# Patient Record
Sex: MALE | Race: WHITE | NOT HISPANIC OR LATINO | Employment: FULL TIME | URBAN - METROPOLITAN AREA
[De-identification: names, ages, dates, MRNs, and addresses within clinical notes are randomized per-mention and may not be internally consistent; named-entity substitution may affect disease eponyms.]

---

## 2024-09-05 ENCOUNTER — OFFICE VISIT (OUTPATIENT)
Dept: OTOLARYNGOLOGY | Facility: CLINIC | Age: 64
End: 2024-09-05
Payer: COMMERCIAL

## 2024-09-05 DIAGNOSIS — H90.3 ASNHL (ASYMMETRICAL SENSORINEURAL HEARING LOSS): Primary | ICD-10-CM

## 2024-09-05 DIAGNOSIS — R05.3 CHRONIC COUGH: ICD-10-CM

## 2024-09-05 DIAGNOSIS — J31.0 CHRONIC RHINITIS: ICD-10-CM

## 2024-09-05 DIAGNOSIS — G47.33 OSA (OBSTRUCTIVE SLEEP APNEA): ICD-10-CM

## 2024-09-05 DIAGNOSIS — K21.9 CHRONIC GERD: ICD-10-CM

## 2024-09-05 PROCEDURE — 99204 OFFICE O/P NEW MOD 45 MIN: CPT | Performed by: GENERAL PRACTICE

## 2024-09-05 PROCEDURE — 31575 DIAGNOSTIC LARYNGOSCOPY: CPT | Performed by: GENERAL PRACTICE

## 2024-09-05 RX ORDER — TADALAFIL 10 MG/1
TABLET ORAL
COMMUNITY
Start: 2024-03-19

## 2024-09-05 RX ORDER — CLOMIPHENE CITRATE 50 MG/1
50 TABLET ORAL
COMMUNITY
Start: 2024-07-30

## 2024-09-05 RX ORDER — ATORVASTATIN CALCIUM 40 MG/1
1 TABLET, FILM COATED ORAL
COMMUNITY
Start: 2024-08-19

## 2024-09-05 RX ORDER — LISINOPRIL 20 MG/1
1 TABLET ORAL
COMMUNITY
Start: 2024-08-19

## 2024-09-05 RX ORDER — OMEPRAZOLE 20 MG/1
20 CAPSULE, DELAYED RELEASE ORAL
Qty: 30 CAPSULE | Refills: 3 | Status: SHIPPED | OUTPATIENT
Start: 2024-09-05 | End: 2025-09-05

## 2024-09-05 RX ORDER — AMLODIPINE BESYLATE 10 MG/1
1 TABLET ORAL
COMMUNITY
Start: 2024-08-19

## 2024-09-05 RX ORDER — FAMOTIDINE 20 MG/1
20 TABLET, FILM COATED ORAL NIGHTLY
Qty: 30 TABLET | Refills: 3 | Status: SHIPPED | OUTPATIENT
Start: 2024-09-05 | End: 2025-09-05

## 2024-09-07 NOTE — PROGRESS NOTES
Otolaryngology - Head and Neck Surgery Outpatient New Patient Visit Note        Assessment/Plan:   Problem List Items Addressed This Visit    None  Visit Diagnoses         Codes    ASNHL (asymmetrical sensorineural hearing loss)    -  Primary H90.3    Relevant Orders    Hearing aid evaluation    MR IAC wo IV contrast    Chronic GERD     K21.9    Relevant Medications    omeprazole (PriLOSEC) 20 mg DR capsule    famotidine (Pepcid) 20 mg tablet    Chronic rhinitis     J31.0    Relevant Medications    omeprazole (PriLOSEC) 20 mg DR capsule    famotidine (Pepcid) 20 mg tablet    VARSHA (obstructive sleep apnea)     G47.33    Chronic cough     R05.3            63yoM with asymmetric SNHL, will acquire MRI of IACs.   Medically cleared for hearing amplification, pending MRI.      LPR changes on exam, contrinbuting to chronic throat clearing and cough.   Discussed conservative management of reflux, and risks of long term anti-reflux medications.  Discussed avoidance of triggers, dietary and behavioral management strategies for reflux.  Discussed possible referral to GI for further testing and evaluation.  Will trial maximal medical therapy (combination PPI and H2 blockers) for 1-2 months and assess for symptom response.  Plan for taper of medical management to least possible to control symptoms, in favor of using dietary/behavioral controls.       Deviated septum contributng to snoring and symptoms of VARSHA.  CPAP intolerant and pt is s/p tonsil/UPPP.  Consider correction of deviated septum to aid in symptoms.          Follow up:  -plan for follow up in clinic as needed, after completion of ordered studies, and in 1-2 months    All of the above findings, impressions, treatment planning and follow up plans were discussed with the patient who indicated understanding.  the patient was instructed to contact or return to clinic sooner if symptoms/signs persist or worsen despite the above management.      Hill Felipe MD  Otolaryngology  "- Head and Neck Surgery            History Of Present Illness  Angel Melendez \"Yeison" is a 63 y.o. male presenting for evaluation of hearing loss, cough, snoring.    Reports L>R hearing loss and recent audio shows L>R high frequency SNHL (asymm).   The patient reports slow progression of the hearing loss over time.  The paitent reports a history of intermittent, waxing/waning, nonpulsatile, tonal tinnitus which does not interfere with hearing.   The patient reports a history of significant noise exposure due to occupational exposures, industrial noise, etc.     The patient denies sudden changes in hearing.  The patient denies otalgia, otorrhea, vertigo, or facial weakness.    The patient denies a history of otologic surgery or trauma.  The patient denies a history of blast injury, TBI or concussion associated with hearing loss.  The patient denies a family history of significant hearing loss.  The patient reports a history of AOM as a child, but no recent significant history of ear infection.  No reported exposure to known ototoxins (chemotherapeutics, aminoglycosides, loop diurectics, high dose NSAIDs).   No reported history of radiation treatment to the head/neck.     The pt reports some chronic cough, throat clearing.  Worse in AM and supine.   Notes pharygeal mucous, mild dysphagia.   Infrequent GERD    Notes a  history of snoring and VARSHA.    CPAP intolerant.  Prior tonsil/UPPP with some effect.     Notes some chronic congestion and nasal obstruction.   No significant rhinitis/sinusitis.  No prior nasla trauma/surgery.             Past Medical History  He has no past medical history on file.    Surgical History  He has no past surgical history on file.     Social History  He reports that he has never smoked. He has never used smokeless tobacco. No history on file for alcohol use and drug use.    Family History  No family history on file.     Allergies  Patient has no known allergies.    Review of Systems  ROS: " Pertinent positives as noted in HPI.    - CONSTITUTIONAL: Does not report weight loss, fever or chills.    - HEENT:   Ear: Does not report  , vertigo,    , otalgia, otorrhea  Nose: Does not report  , rhinorrhea, epistaxis, decreased smell  Throat: Does not report pain, dysphagia, odynophagia  Larynx: Does not report hoarseness,  difficulty breathing, pain with speaking (odynophonia)  Neck: Does not report new masses, pain, swelling  Face: Does not report sinus pain, pressure, swelling, numbness, weakness     - RESPIRATORY: Does not report SOB or  .    - CV: Does not report palpitations or chest pain.     - GI: Does not report abdominal pain, nausea, vomiting or diarrhea.    - : Does not report dysuria or urinary frequency.    - MSK: Does not report myalgia or joint pain.    - SKIN: Does not report rash or pruritus.    - NEUROLOGICAL: Does not report headache or syncope.    - PSYCHIATRIC: Does not report recent changes in mood. Does not report anxiety or depression.         Physical Exam:     GENERAL:   Alert & Oriented to person, place and time; Normal affect and appearance. Well developed and well nourished. Conversant & cooperative with examination.     HEAD:   Normocephalic, atraumatic. No sinus tenderness to palpation. Normal parotid bilaterally. Normal facial strength.     NEUROLOGIC:   Cranial nerves II-XII grossly intact, gait WNL. Normal mood and affect.    EYES:   Extraocular movements intact. Pupils equal, round, reactive to light and accommodation. No nystagmus, no ptosis. no scleral injection.    EAR:   Normal auricle. No discomfort or TTP with manipulation.   Handheld otoscopic exam showed normal external auditory canals bilaterally. No purulence or EAC inflammation. Minimal cerumen.   Right tympanic membrane clear and mobile without evidence of perforation, retraction or middle ear effusion.   Left tympanic membrane clear and mobile without evidence of perforation, retraction or middle ear effusion.      NOSE:   No external deformity. No external nasal lesions, lacerations, or scars. Nasal tip symmetrical with normal nasal valves.   Nasal cavity with leftward deviated  septum, normal mucosa and turbinates. No lesions, masses, purulence or polyps.     OC/OP:   Mucous membranes moist, no masses, lesions or exudates.   Normal tongue, floor of mouth, teeth, gums, lips. Normal posterior pharyngeal wall.    Normal tonsils without erythema, exudate or obvious calculi     NECK:   No neck masses or thyroid enlargement. Trachea midline. No tenderness to palpation    LYMPHATIC:   No cervical lymphadenopathy.     RESPIRATORY:   Symmetric chest elevation & no retractions. No significant hoarseness. No increased work of breathing.    CV:   No clubbing or cyanosis. No obvious edema    Skin:   No facial rashes, vesicles or lesions.     Extremities:   No gross abnormalities      Clinic Procedure    Nasal Endoscopy Laryngoscopy Nasophrayngosocopy   Procedure: flexible fiberoptic laryngoscopy, nasopharyngoscopy.   Flexible Fiberoptic Laryngoscopy Indication:   inability to tolerate mirror exam     Risks, benefits, and alternatives, infection risk, bleeding risk and risk of mucosal trauma and pain were discussed with the patient. Verbal consent was obtained prior to the procedure and is detailed in the patient's record.     Procedure Note:      With the patient seated in the exam chair, the bilateral nasal cavity was topically treated with 4% lidocaine and phenylephrine.  The bilateral nasal cavity was intubated with the flexible laryngoscope.   Nasal Endoscopy: Nasal endoscopy was successfully completed using the endoscope and the nasal mucosa, septum, turbinates, and osteomeatal complex were examined.  Nasopharyngoscopy: Nasopharyngoscopy was successfully completed using the endoscope and the nasal mucosa, septum, turbinates, osteomeatal complex, and nasopharynx were examined.   Laryngoscopy: Laryngoscopy was successfully  completed using the flexible laryngoscope and the nasopharynx, hypopharynx, and larynx were examined.  Patient Status: the patient tolerated the procedure well.   Complications: there were no complications.      . After obtaining adequate decongestion and anesthesia, the scope was introduced into the floor of the nasal cavity. The septum, inferior, and middle turbinates were without any mucosal lesions.  The Fossa of [place]mu[place] were clear bilaterally, and good velopharyngeal closure was obtained on phonation.  Base of tongue, tonsillar complex, and posterior pharyngeal wall free of asymmetry or concerning ulcerations or masses.  supraglottic segments with edema, pachydermia and erythema at the esophageal opening and posterior supraglottis.  scattered mucous debris.  No mucosal ulcerations or masses.  True vocal cords abduct and adduct normally with no mucosal or mass lesions.  No masses visualized in the pyriform recesses.  The scope was removed and patient tolerated the procedure well.      Information review:  External sources (notes, imaging, lab results) listed below personally reviewed to aid in medical decision making process.  - audio 7/8/24   - PCM note 5/22/24  -

## 2024-09-20 ENCOUNTER — HOSPITAL ENCOUNTER (OUTPATIENT)
Dept: RADIOLOGY | Facility: CLINIC | Age: 64
Discharge: HOME | End: 2024-09-20
Payer: COMMERCIAL

## 2024-09-20 DIAGNOSIS — H90.3 ASNHL (ASYMMETRICAL SENSORINEURAL HEARING LOSS): ICD-10-CM

## 2024-09-20 PROCEDURE — 70551 MRI BRAIN STEM W/O DYE: CPT

## 2024-10-28 ENCOUNTER — APPOINTMENT (OUTPATIENT)
Dept: OTOLARYNGOLOGY | Facility: CLINIC | Age: 64
End: 2024-10-28
Payer: COMMERCIAL

## 2024-10-28 VITALS — WEIGHT: 175 LBS

## 2024-10-28 DIAGNOSIS — J34.2 DEVIATED SEPTUM: ICD-10-CM

## 2024-10-28 DIAGNOSIS — H90.3 ASYMMETRICAL SENSORINEURAL HEARING LOSS: ICD-10-CM

## 2024-10-28 DIAGNOSIS — K21.9 LARYNGOPHARYNGEAL REFLUX (LPR): ICD-10-CM

## 2024-10-28 DIAGNOSIS — G47.33 OSA (OBSTRUCTIVE SLEEP APNEA): Primary | ICD-10-CM

## 2024-10-28 PROCEDURE — 1036F TOBACCO NON-USER: CPT | Performed by: GENERAL PRACTICE

## 2024-10-28 PROCEDURE — 99214 OFFICE O/P EST MOD 30 MIN: CPT | Performed by: GENERAL PRACTICE

## 2024-10-31 DIAGNOSIS — G47.33 OSA (OBSTRUCTIVE SLEEP APNEA): Primary | ICD-10-CM

## 2024-11-11 ENCOUNTER — APPOINTMENT (OUTPATIENT)
Dept: AUDIOLOGY | Facility: CLINIC | Age: 64
End: 2024-11-11
Payer: COMMERCIAL

## 2024-11-11 DIAGNOSIS — H90.3 ASNHL (ASYMMETRICAL SENSORINEURAL HEARING LOSS): Primary | ICD-10-CM

## 2024-11-11 PROCEDURE — 92700 UNLISTED ORL SERVICE/PX: CPT | Mod: 59,AUDSP

## 2024-11-11 PROCEDURE — 92567 TYMPANOMETRY: CPT

## 2024-11-11 ASSESSMENT — PAIN - FUNCTIONAL ASSESSMENT: PAIN_FUNCTIONAL_ASSESSMENT: 0-10

## 2024-11-11 ASSESSMENT — PAIN SCALES - GENERAL: PAINLEVEL_OUTOF10: 0 - NO PAIN

## 2024-11-11 NOTE — PROGRESS NOTES
"AUDIOLOGY HEARING AID CONSULTATION      Name:  Angel Melendez \"Marco\"  :  1960  Age:  64 y.o.  Date of Encounter:  2024     Time: 2527-3513    HISTORY   Mr. Melendez and spouse was seen today for a hearing aid consultation. A previous hearing evaluation (outside ) on 2024 indicated hearing sensitivity within normal limits with mild sensorineural hearing loss notch centered at 4000 Hz in the right ear, and hearing sensitivity within normal limits sloping to a moderately-severe notch at 4000 Hz, rising to mild at 8000 Hz in the left ear, with excellent word recognition in the right ear, and good in the left ear. Angel indicates he is interested in hearing aids.     Top 3 listening environments to improve:  Improved hearing in all situations.   Hearing at work when there is background noise.  Hearing his wife when he does not have access to facial cues.   Hearing his brother when his is at work and is mumbling while wearing a face mask.    Most important consideration for hearing aids: Increase understanding and decrease saying \"huh?\"    TEST RESULTS - See scanned audiogram in \"Media.\"   Otoscopic Evaluation:   Right Ear: Ear canal clear, tympanic membrane visualized.   Left Ear: Ear canal clear, tympanic membrane visualized.       Tympanometry (226 Hz): An objective evaluation of middle ear function. CPT code: 40292   Right Ear: Type A, middle ear pressure and tympanic membrane compliance within normal limits.   Left Ear: Type A, middle ear pressure and tympanic membrane compliance within normal limits.       Acoustic Reflexes: An objective measure of auditory and facial nerve pathways.   (Probe) Right Ear (ipsi right stimulus ear; contralateral left stimulus ear):   (Ipsilateral) Screened at 1000 Hz, response present.    (Probe) Left Ear (ipsi left stimulus ear; contralateral right stimulus ear):   (Ipsilateral) Screened at 1000 Hz, response present.        Distortion Product Otoacoustic " Emissions (DPOAE): An objective measurement of responses generated by the cochlea when simultaneously stimulated by two pure tone frequencies. CPT code: 01524   Right Ear: Essentially present. Responses present at 6939-0385 and 6085-9569 Hz. Response(s) absent at 1000 and 9670-1159 Hz.   Left Ear: Essentially absent. Response(s) present at 8365-0039 Hz. Responses absent at 9351-3357 Hz.   Present OAEs suggest normal or near cochlear outer hair cell function for corresponding frequency region(s). Absent OAEs with normal middle ear function can be consistent with some degree of hearing loss. Assessment of cochlear outer hair cell function may be impacted by outer or middle ear function.      Test technique: Conventional Audiometry via headphones.   An evaluation of hearing sensitivity via air and bone conduction and speech recognition. CPT code: 18591   Reliability: good       Speech Audiometry:    Right Ear:   Quick-SIN tested was administered at 70 dB HL, and testing revealed a signal to noise ratio (SNR) loss of 1.5, which is categorized as normal/near normal (0-3 dB), patient may hear better than those with normal hearing are able to in noise.   Left Ear:   Quick-SIN tested was administered at 70 dB HL, and testing revealed a signal to noise ratio (SNR) loss of 4.5, which is categorized as a mild SNR loss (3-7 dB), patient may hear almost as well as those with normal hearing are able to hear in noise.   Binaural: Quick-SIN tested was administered at 70 dB HL, and testing revealed a signal to noise ratio (SNR) loss of 1.5, which is categorized as normal/near normal (0-3 dB), patient may hear better than those with normal hearing are able to in noise.       IMPRESSIONS AND RECOMMENDATIONS    The hearing test from 7/11/2024 was reviewed with the patient and spouse. They are a hearing aid candidate in both ears. The benefits and drawbacks of different hearing aid styles and levels of technology were reviewed. The  patient and spouse decided to complete a flex trial prior to purchasing hearing aids. Pricing and policies were reviewed. Basic hearing aid use and parts were discussed.    Hearing Aid Information Right Left    Phonak Phonak   Model Audéo I90-R or I70-R Audéo I90-R or I70-R    2M 2M   Dome Medium open Medium open     Patient paid in full ($150.00) for flex trial.     TREATMENT PLAN     Return for Flex Trial follow-up on 12/11/2024 at 3:30 PM with Roberto Hagen CCC-A.  Return for audiologic assessment in conjunction with otologic care or annually, whichever is sooner. Return sooner if concerns arise.   Continue medical follow-up with PCP/ENT as recommended.  Strive for full-time device use during waking hours, except when activities preclude device safety.  Consider use of good communication strategies. These include but are not limited to the following: get Angel's attention before speaking to him, close the distance between Angel and who is speaking, limit background noise, allow Angel access to visual cues (i.e. facial expressions/mouth movements, pictures, written instructions, etc.). When in situations where background noise cannot be avoided, position yourself so that the background noise is to your back, and you communication partner is seated in front of you, ideally with a quiet area or wall behind them.   Continue medical follow up with primary care provider and/or Ears Nose and Throat (ENT) provider as recommended.  Avoid exposure to loud sounds by moving away from the noise, turning down the volume, or wearing proper hearing protection correctly.      ROBERTO Hagen, CCC-A   Licensed Clinical Audiologist

## 2024-11-15 ENCOUNTER — CLINICAL SUPPORT (OUTPATIENT)
Dept: SLEEP MEDICINE | Facility: CLINIC | Age: 64
End: 2024-11-15
Payer: COMMERCIAL

## 2024-11-15 DIAGNOSIS — G47.33 OSA (OBSTRUCTIVE SLEEP APNEA): ICD-10-CM

## 2024-11-15 NOTE — PROGRESS NOTES
Type of Study: HOME SLEEP STUDY - NOMAD     The patient received equipment and instructions for use of the [employer] [facility] Nomad HSAT device. The patient was instructed how to apply the effort belts, cannula, thermistor. It was also explained how the Nomad and oximeter components work.  The patient was asked to record their sleep for an 8-hour period.     The patient was informed of their responsibility for the device and acknowledged this by signing the HSAT device contract. The patient was asked to return the device on 11/16/2024 between the hours of 08:00 - 15:00  to the [facility].     The patient was instructed to call 911 as usual for any medical- emergencies while at home.  The patient was also given a phone number for troubleshooting when using the device in case there were additional questions.

## 2024-11-27 DIAGNOSIS — K21.9 CHRONIC GERD: ICD-10-CM

## 2024-11-27 DIAGNOSIS — J31.0 CHRONIC RHINITIS: ICD-10-CM

## 2024-11-27 RX ORDER — OMEPRAZOLE 20 MG/1
20 CAPSULE, DELAYED RELEASE ORAL
Qty: 90 CAPSULE | Refills: 2 | Status: SHIPPED | OUTPATIENT
Start: 2024-11-27 | End: 2025-11-27

## 2024-12-10 DIAGNOSIS — G47.33 OSA (OBSTRUCTIVE SLEEP APNEA): Primary | ICD-10-CM

## 2024-12-11 ENCOUNTER — APPOINTMENT (OUTPATIENT)
Dept: AUDIOLOGY | Facility: CLINIC | Age: 64
End: 2024-12-11
Payer: COMMERCIAL

## 2024-12-11 DIAGNOSIS — H90.3 ASNHL (ASYMMETRICAL SENSORINEURAL HEARING LOSS): Primary | ICD-10-CM

## 2024-12-18 NOTE — PROGRESS NOTES
"AUDIOLOGY HEARING AID FLEX TRIAL RETURN     Name:  Angel Melendez \"Marco\"   :  1960  Age:  64 y.o.  Date of Evaluation:  2024    Time: 5647-0947    RECOMMENDATIONS     Return for audiologic assessment in conjunction with otologic care or annually, whichever is sooner. Return sooner if concerns arise.   Continue medical follow-up with PCP/ENT as recommended.  Consider use of good communication strategies. These include but are not limited to the following: get Angel's attention before speaking to him, close the distance between Angel and who is speaking, limit background noise, allow Angel access to visual cues (i.e. facial expressions/mouth movements, pictures, written instructions, etc.). When in situations where background noise cannot be avoided, position yourself so that the background noise is to your back, and you communication partner is seated in front of you, ideally with a quiet area or wall behind them.   Avoid exposure to loud sounds by moving away from the noise, turning down the volume, or wearing proper hearing protection correctly.    HISTORY     Mr. Melendez was seen today for return of Flex Trial hearing aids. Patient was unaccompanied.     Today, he reported perceived benefit with the use of the hearing aids. However, he will defer obtaining hearing aids at this time. Cost of the technology is a factor.     The hearing aids were returned in good condition.      NISSA Hagen, CCC-A   Licensed Clinical Audiologist   "

## 2025-02-18 NOTE — PROGRESS NOTES
"2/28/2025 New patient visit for severe VARSHA and surgical consult    Pt referred be ENT, Hill Felipe. At last visit Dr Felipe writes:  \"63yoM with asymmetric SNHL without concerning findings on MRI IAC.  Medically cleared for hearing amplification PRN.    Ongoing symptoms of VARSHA.  Prior mild VARSHA on PSG >20y ago and wife reports worsening symptoms.  Prior UPPPs.  Will acquire PSG to guide management.  Discussed options of possible CPAP, septoturbinoplasty, referral for [facility].  Pt will consider based on PSG results.   Good effect of PPI on relieving GERD symptoms.  Not taking H2 blocker with any consistency.  Discussed ongoing taper of medical management in favor of dietary/behavioral controls. \"    referred for an initial consultation with a long history of reported loud snoring, witnessed apneas, waking up feeling unrefreshed, excessive daytime fatigue. [place] Sleepiness Scale Score is 14 /24. Fatigue Severity Scale Score is 37 /63. Snoring VAS 10 /10    Sleep study histories: (personally reviewed raw data such as interpretation report, data sheet, hypnogram, and titration table)  Home Sleep Test Completed 11/15/2024  The REI3% was 59.8/hr. The REI4% was 48.6/hr. TIMMY was 0.7/hr.   The supine REI3% was 80.9; non-supine REI3% was 35.8.   The supine REI4% was 71.9; non-supine REI4% was 22.1.   During sleep, based on REI3%, the breathing pattern demonstrated severe sleep disordered breathing, characterized predominantly by obstructive events.   The mean oxygen saturation was 91% during the monitoring time.   The oxygen saturation was < = 88% for 86 minutes.   The SpO2 caleb was 66%.     Patient reports to be claustrophobic and can not tolerate PAP mask.     For nocturnal symptoms (without or prior to treatment), he endorses that there is  snoring, witnessed apneas, nocturia and restless sleep.    Patient does reports nasal obstruction.  It does fluctuate, and has been treated with nasal corticosteroids without " significant improvements.  He does not have a history of nasal surgery or upper airway surgery. He does not have a history of nasal trauma. He does not describe a history of facial pressure type of headaches.  He does not report a history of nasal drainage, denies epistaxis.   NOSE-  15 / 20  Nasal Obstruction VAS- 7 /10    PAP - no history    [employer] qualifications for [employer]    18 years and older    BMI < or = 40    If AHI is abov 65 or BMI is above published policy a letter documenting benefits at the higher numbers will be needed for submission([employer] has template)    Diagnostic PSG/HST within 2 years of initial consult is required for most    PHYSICAL EXAMINATION:  Constitutional:  No acute distress  Voice:  No hoarseness or other abnormality   Respiration:  Breathing comfortably, no stridor  Eyes:  EOM intact, sclera normal  Neuro:  Alert and conversive  Head and Face:  Symmetric facial features, no masses or lesions. Some redness/flaky skin around forehead and cheeks.   Salivary Glands:  Parotid and submandibular glands normal bilaterally  Right Ear:  Normal external ear, external auditory canal, and TM to otoscopy  Left Ear: Normal external ear, external auditory canal, and TM to otoscopy  Nose:  External nose midline, anterior rhinoscopy is normal with limited visualization to the anterior aspect of the inferior turbinates, no bleeding or drainage, no lesions  Oral Cavity/Oropharynx/Lips:  Normal mucous membranes, normal floor of mouth/tongue/OP, no masses or lesions, tonsils 2+. Mallampati/Martins grade 3  Neck/Lymph:  No LAD, no thyroid masses, trachea midline  Skin:  Neck skin is without scar or injury  Psych:  Alert with appropriate mood and affect      Procedure: Diagnostic Nasal/ Pharyngeal Endoscopy     Indication for procedure: To evaluate static and dynamic upper anatomy not visible by anterior rhinoscopy and oral cavitiy examination for anatomic risk factors of obstructive sleep apnea.       Anesthesia: 1% phenylephrine,4% lidocaine topical spray     Description:   A flexible endoscope was used to examine the left and right nasal cavities.  The nasal valve areas were examined for abnormalities or collapse.  The inferior and middle turbinates were evaluated and abnormalities noted. The scope was then passed through the nasopharygneal, oropharyngeal, and hypopharyngeal airway. The Kern Maneuver was performed with the patient in sitting position.Collapse was assessed during a maximal inspiratory effort against a closed mouth and sealed nose. The patient tolerated the procedure without complications and was returned to ambulatory status.       Findings:   Nasopharynx: There is not adenoid hypertrophy.   Oropharynx: There is not palatine tonsillar hypertrophy.   With Mckeon maneuver, soft palatal collapse is grade 2, lateral pharyngeal wall collapse is grade 1.  Hypopharynx: There is not lingual tonsillar hypertrophy, with lingual tonsils of Grade 2. Vocal Cord position with Grade 3 view.  With Mckeon maneuver, base of tongue collapse is grade 3. This is improved with the patient doing a jaw thrust maneuver.    Diagnose:  VARSHA   Deviated septum   Chronic nasal Obstruction    Plan:  Dise + septoplasty + turbinate reduction

## 2025-02-28 ENCOUNTER — APPOINTMENT (OUTPATIENT)
Dept: OTOLARYNGOLOGY | Facility: CLINIC | Age: 65
End: 2025-02-28
Payer: COMMERCIAL

## 2025-02-28 VITALS
WEIGHT: 178 LBS | HEART RATE: 65 BPM | SYSTOLIC BLOOD PRESSURE: 124 MMHG | BODY MASS INDEX: 26.36 KG/M2 | TEMPERATURE: 98.1 F | HEIGHT: 69 IN | DIASTOLIC BLOOD PRESSURE: 78 MMHG

## 2025-02-28 DIAGNOSIS — G47.33 OSA (OBSTRUCTIVE SLEEP APNEA): ICD-10-CM

## 2025-02-28 DIAGNOSIS — J34.2 DEVIATED NASAL SEPTUM: ICD-10-CM

## 2025-02-28 DIAGNOSIS — J34.3 HYPERTROPHY OF NASAL TURBINATES: ICD-10-CM

## 2025-02-28 PROCEDURE — 99214 OFFICE O/P EST MOD 30 MIN: CPT | Mod: 25

## 2025-02-28 PROCEDURE — 1036F TOBACCO NON-USER: CPT

## 2025-02-28 PROCEDURE — 31575 DIAGNOSTIC LARYNGOSCOPY: CPT

## 2025-02-28 PROCEDURE — 3008F BODY MASS INDEX DOCD: CPT

## 2025-02-28 PROCEDURE — 99204 OFFICE O/P NEW MOD 45 MIN: CPT

## 2025-02-28 SDOH — ECONOMIC STABILITY: FOOD INSECURITY: WITHIN THE PAST 12 MONTHS, YOU WORRIED THAT YOUR FOOD WOULD RUN OUT BEFORE YOU GOT MONEY TO BUY MORE.: NEVER TRUE

## 2025-02-28 SDOH — ECONOMIC STABILITY: FOOD INSECURITY: WITHIN THE PAST 12 MONTHS, THE FOOD YOU BOUGHT JUST DIDN'T LAST AND YOU DIDN'T HAVE MONEY TO GET MORE.: NEVER TRUE

## 2025-02-28 ASSESSMENT — ENCOUNTER SYMPTOMS
DEPRESSION: 0
OCCASIONAL FEELINGS OF UNSTEADINESS: 0
LOSS OF SENSATION IN FEET: 0

## 2025-02-28 ASSESSMENT — LIFESTYLE VARIABLES
AUDIT-C TOTAL SCORE: 4
HOW OFTEN DO YOU HAVE A DRINK CONTAINING ALCOHOL: 4 OR MORE TIMES A WEEK
HOW OFTEN DO YOU HAVE SIX OR MORE DRINKS ON ONE OCCASION: NEVER
HOW MANY STANDARD DRINKS CONTAINING ALCOHOL DO YOU HAVE ON A TYPICAL DAY: 1 OR 2
SKIP TO QUESTIONS 9-10: 1

## 2025-02-28 ASSESSMENT — PATIENT HEALTH QUESTIONNAIRE - PHQ9
1. LITTLE INTEREST OR PLEASURE IN DOING THINGS: NOT AT ALL
2. FEELING DOWN, DEPRESSED OR HOPELESS: NOT AT ALL
SUM OF ALL RESPONSES TO PHQ9 QUESTIONS 1 AND 2: 0

## 2025-02-28 ASSESSMENT — PAIN SCALES - GENERAL: PAINLEVEL_OUTOF10: 0-NO PAIN

## 2025-02-28 ASSESSMENT — COLUMBIA-SUICIDE SEVERITY RATING SCALE - C-SSRS
2. HAVE YOU ACTUALLY HAD ANY THOUGHTS OF KILLING YOURSELF?: NO
6. HAVE YOU EVER DONE ANYTHING, STARTED TO DO ANYTHING, OR PREPARED TO DO ANYTHING TO END YOUR LIFE?: NO
1. IN THE PAST MONTH, HAVE YOU WISHED YOU WERE DEAD OR WISHED YOU COULD GO TO SLEEP AND NOT WAKE UP?: NO

## 2025-03-11 ENCOUNTER — TELEPHONE (OUTPATIENT)
Dept: OTOLARYNGOLOGY | Facility: HOSPITAL | Age: 65
End: 2025-03-11
Payer: COMMERCIAL

## 2025-03-11 NOTE — TELEPHONE ENCOUNTER
Topic: denied surgery    Pt called stating surgery was denied for 3/24.     I informed pt that I did receive a message from the precert department this morning stating that the DISE [id number] was denied but the septoplasty, and turbinate resection does not require prior authorization.    Pt aware that the reason the DISE was denied was that he never tried oral appliance.  Pt states he is missing a couple teeth.  Dr Kunz aware and will assess if ot is even a candidate for oral appliance and if peer to peer will be completed.    Pt verbalized understanding

## 2025-03-13 ENCOUNTER — TELEPHONE (OUTPATIENT)
Dept: OTOLARYNGOLOGY | Facility: CLINIC | Age: 65
End: 2025-03-13
Payer: COMMERCIAL

## 2025-03-13 NOTE — TELEPHONE ENCOUNTER
Topic: Denied DISE procedure has been approved     [facility] denied DISE procedure [id number].  Earlier this week I sent Dr Kunz's updated note to [facility] for reconsideration and I set up a peer to peer for today at 2pm.    Dr Junior, [facility]'s medical reviewer, called me this afternoon and states he reviewed the updated clinical note and he has approved the DISE procedure.    Denial overturned.  Armida Wilson in the precert dept and Dr Kunz aware.    I called pt and left a message that the denial was overturned and DISE procedure has been approved.  Pt to call me back if he has questions.

## 2025-03-14 ENCOUNTER — PRE-ADMISSION TESTING (OUTPATIENT)
Dept: PREADMISSION TESTING | Facility: HOSPITAL | Age: 65
End: 2025-03-14
Payer: COMMERCIAL

## 2025-03-14 VITALS
TEMPERATURE: 97.2 F | SYSTOLIC BLOOD PRESSURE: 140 MMHG | BODY MASS INDEX: 25.99 KG/M2 | HEIGHT: 69 IN | OXYGEN SATURATION: 99 % | RESPIRATION RATE: 18 BRPM | DIASTOLIC BLOOD PRESSURE: 81 MMHG | WEIGHT: 175.49 LBS | HEART RATE: 68 BPM

## 2025-03-14 DIAGNOSIS — Z01.818 PRE-OP TESTING: Primary | ICD-10-CM

## 2025-03-14 LAB
ANION GAP SERPL CALC-SCNC: 11 MMOL/L (ref 10–20)
BUN SERPL-MCNC: 16 MG/DL (ref 6–23)
CALCIUM SERPL-MCNC: 9.6 MG/DL (ref 8.6–10.3)
CHLORIDE SERPL-SCNC: 103 MMOL/L (ref 98–107)
CO2 SERPL-SCNC: 26 MMOL/L (ref 21–32)
CREAT SERPL-MCNC: 0.99 MG/DL (ref 0.5–1.3)
EGFRCR SERPLBLD CKD-EPI 2021: 85 ML/MIN/1.73M*2
ERYTHROCYTE [DISTWIDTH] IN BLOOD BY AUTOMATED COUNT: 12.3 % (ref 11.5–14.5)
GLUCOSE SERPL-MCNC: 105 MG/DL (ref 74–99)
HCT VFR BLD AUTO: 43.5 % (ref 41–52)
HGB BLD-MCNC: 14.4 G/DL (ref 13.5–17.5)
MCH RBC QN AUTO: 29.9 PG (ref 26–34)
MCHC RBC AUTO-ENTMCNC: 33.1 G/DL (ref 32–36)
MCV RBC AUTO: 90 FL (ref 80–100)
NRBC BLD-RTO: 0 /100 WBCS (ref 0–0)
PLATELET # BLD AUTO: 376 X10*3/UL (ref 150–450)
POTASSIUM SERPL-SCNC: 4.4 MMOL/L (ref 3.5–5.3)
RBC # BLD AUTO: 4.82 X10*6/UL (ref 4.5–5.9)
SODIUM SERPL-SCNC: 136 MMOL/L (ref 136–145)
WBC # BLD AUTO: 4.6 X10*3/UL (ref 4.4–11.3)

## 2025-03-14 PROCEDURE — 93010 ELECTROCARDIOGRAM REPORT: CPT | Performed by: STUDENT IN AN ORGANIZED HEALTH CARE EDUCATION/TRAINING PROGRAM

## 2025-03-14 PROCEDURE — 80048 BASIC METABOLIC PNL TOTAL CA: CPT

## 2025-03-14 PROCEDURE — 93005 ELECTROCARDIOGRAM TRACING: CPT

## 2025-03-14 PROCEDURE — 99204 OFFICE O/P NEW MOD 45 MIN: CPT | Performed by: NURSE PRACTITIONER

## 2025-03-14 PROCEDURE — 36415 COLL VENOUS BLD VENIPUNCTURE: CPT

## 2025-03-14 PROCEDURE — 85027 COMPLETE CBC AUTOMATED: CPT

## 2025-03-14 ASSESSMENT — DUKE ACTIVITY SCORE INDEX (DASI)
CAN YOU CLIMB A FLIGHT OF STAIRS OR WALK UP A HILL: YES
CAN YOU DO HEAVY WORK AROUND THE HOUSE LIKE SCRUBBING FLOORS OR LIFTING AND MOVING HEAVY FURNITURE: YES
CAN YOU HAVE SEXUAL RELATIONS: YES
CAN YOU TAKE CARE OF YOURSELF (EAT, DRESS, BATHE, OR USE TOILET): YES
CAN YOU DO YARD WORK LIKE RAKING LEAVES, WEEDING OR PUSHING A MOWER: YES
CAN YOU PARTICIPATE IN MODERATE RECREATIONAL ACTIVITIES LIKE GOLF, BOWLING, DANCING, DOUBLES TENNIS OR THROWING A BASEBALL OR FOOTBALL: NO
CAN YOU DO MODERATE WORK AROUND THE HOUSE LIKE VACUUMING, SWEEPING FLOORS OR CARRYING GROCERIES: YES
CAN YOU WALK INDOORS, SUCH AS AROUND YOUR HOUSE: YES
TOTAL_SCORE: 44.7
CAN YOU PARTICIPATE IN STRENOUS SPORTS LIKE SWIMMING, SINGLES TENNIS, FOOTBALL, BASKETBALL, OR SKIING: NO
CAN YOU DO LIGHT WORK AROUND THE HOUSE LIKE DUSTING OR WASHING DISHES: YES
CAN YOU WALK A BLOCK OR TWO ON LEVEL GROUND: YES
DASI METS SCORE: 8.2
CAN YOU RUN A SHORT DISTANCE: YES

## 2025-03-14 NOTE — CPM/PAT H&P
"CPM/PAT Evaluation       Name: Angel Melendez (Angel Melendez)  /Age: 1960/64 y.o.     In-Person       Chief Complaint:     64 yr old male presents to [facility] for pre-operative evaluation, with c/o DNS, VARSHA  SEPTOPLASTY / BILATERAL TURBINATE OUTFRACTURE  / BILATERAL SUBMUCOSAL REDUCTION  / DRUG INDUCED SLEEP ENDOSCOPY  is Scheduled on 3/24/2025 with Dr. Joaquina Humphrey    The patient has the following past medical history:  VARSHA, GERD, HTN, urinary calculi      Chief complaint:  He reports a dx of VARSHA in   He was treated surgically with a UPPP and somnoplasty and per his wife, did not help  He reports never trying CPAP as a treatment VARSHA    He reports nose is always crooked and has a hard time with nasal breathing  He c/o \"hay fever at age 59\"  He does not treat sinus congestion currently--> states he will sleep in another room d/t snoring  He states congestion fluctuates--> is a mouth breather and he has a  decreased sense of smell  H/o strep throat as a child  Denies sinus / facial pain.  He has tried Flonase and states no improvement      Home Sleep Test Completed 11/15/2024  The REI3% was 59.8/hr. The REI4% was 48.6/hr. TIMMY was 0.7/hr.   The supine REI3% was 80.9; non-supine REI3% was 35.8.   The supine REI4% was 71.9; non-supine REI4% was 22.1.   During sleep, based on REI3%, the breathing pattern demonstrated severe sleep disordered breathing, characterized predominantly by obstructive events.   The mean oxygen saturation was 91% during the monitoring time.   The oxygen saturation was < = 88% for 86 minutes.   The SpO2 caleb was 66%.     PCP Dr. Smith, LOV 2024    Denies fever, chills or nausea.   Denies any past issues with anesthesia.        Vitals:    25 0736   BP: 140/81   Pulse: 68   Resp: 18   Temp: 36.2 °C (97.2 °F)   SpO2: 99%          Past Medical History:   Diagnosis Date    GERD (gastroesophageal reflux disease)     Hearing aid worn     HL (hearing loss)     Hyperlipidemia     " Hypertension     Inguinal hernia     Nephrolithiasis     Sinusitis     Sleep apnea     Vision loss        Past Surgical History:   Procedure Laterality Date    FOOT FRACTURE SURGERY Right     heel fracture repair with hardware    HERNIA REPAIR Left     inguinal    UVULOPALATOPHARYNGOPLASTY         Patient Sexual activity questions deferred to the physician.    Family History   Problem Relation Name Age of Onset    Breast cancer Mother      COPD Sister      Colon cancer Sister       M breast CA with mets  MGM breast cancer  D- colon CA, valve replaced, HTN, AFIB      No Known Allergies    Prior to Admission medications    Medication Sig Start Date End Date Taking? Authorizing Provider   amLODIPine (Norvasc) 10 mg tablet Take 1 tablet (10 mg) by mouth early in the morning.. 8/19/24   Historical Provider, MD   atorvastatin (Lipitor) 40 mg tablet Take 1 tablet (40 mg) by mouth early in the morning.. 8/19/24   Historical Provider, MD   Clomid 50 mg tablet Take 1 tablet (50 mg) by mouth once a day on Monday, Wednesday, and Friday. 7/30/24   Historical Provider, MD   famotidine (Pepcid) 20 mg tablet Take 1 tablet (20 mg) by mouth once daily at bedtime. 9/5/24 9/5/25  Hill Felipe MD   lisinopril 20 mg tablet Take 1 tablet (20 mg) by mouth early in the morning.. 8/19/24   Historical Provider, MD   omeprazole (PriLOSEC) 20 mg DR capsule TAKE 1 CAPSULE (20 MG) BY MOUTH ONCE DAILY IN THE MORNING. TAKE BEFORE MEALS. DO NOT CRUSH OR CHEW. 11/27/24 11/27/25  Hill Felipe MD   tadalafil (Cialis) 10 mg tablet TAKE ONE TABLET BY MOUTH ONCE DAILY AS NEEDED. TAKE 2 HOURS BEFORE SEXUAL ACTIVITY. 3/19/24   Historical Provider, MD          Review of Systems  Constitutional: NO F, chills, or sweats  Eyes: glasses, no blurred vision or visual disturbance  ENT: see HPI, denies congestion, sore throat, difficulty hearing  Cardiovascular: no chest pain, no edema, no palps and no syncope.   Respiratory: SANCHEZ, no cough,no s.o.b. and no  "wheezing  Gastrointestinal: GERD,  no abdominal pain, no N/V, no blood in stools  Genitourinary: PD, low T on clomid, no dysuria, no urinary frequency, no urinary hesitancy and no feelings of urinary urgency.   Musculoskeletal: no arthralgias,  no back pain and no myalgias.   Integumentary: no new skin lesions and no rashes.   Neurological: no difficulty walking, no headache, no limb weakness, no numbness and no tingling.   Endocrine: no recent weight gain and no recent weight loss.   Hematologic/Lymphatic: no tendency for easy bruising and no swollen glands.      Physical Exam  Constitutional:       Appearance: Normal appearance.   Cardiovascular:      Rate and Rhythm: Normal rate.      Heart sounds: Normal heart sounds.   Pulmonary:      Effort: Pulmonary effort is normal.      Breath sounds: Normal breath sounds.   Abdominal:      General: Bowel sounds are normal.      Palpations: Abdomen is soft.   Musculoskeletal:         General: Normal range of motion.      Cervical back: Normal range of motion.      Right lower leg: No edema.      Left lower leg: No edema.   Skin:     General: Skin is warm and dry.   Neurological:      Mental Status: He is alert and oriented to person, place, and time.          PAT AIRWAY:   Airway:     Mallampati::  IV    TM distance::  <3 FB    Neck ROM::  Full  normal        Visit Vitals  /81   Pulse 68   Temp 36.2 °C (97.2 °F) (Temporal)   Resp 18   Ht 1.753 m (5' 9\")   Wt 79.6 kg (175 lb 7.8 oz)   SpO2 99%   BMI 25.91 kg/m²   Smoking Status Never   BSA 1.97 m²       DASI Risk Score      Flowsheet Row Pre-Admission Testing from 3/14/2025 in [facility] Questionnaire Series Submission from 2/28/2025 in [facility] with Generic Provider Krystal   Can you take care of yourself (eat, dress, bathe, or use toilet)?  2.75 filed at 03/14/2025 0721 2.75  filed at 02/28/2025 1949   Can you walk indoors, such as around your house? 1.75 filed at 03/14/2025 0721 1.75  filed at " 02/28/2025 1949   Can you walk a block or two on level ground?  2.75 filed at 03/14/2025 0721 2.75  filed at 02/28/2025 1949   Can you climb a flight of stairs or walk up a hill? 5.5 filed at 03/14/2025 0721 5.5  filed at 02/28/2025 1949   Can you run a short distance? 8 filed at 03/14/2025 0721 8  filed at 02/28/2025 1949   Can you do light work around the house like dusting or washing dishes? 2.7 filed at 03/14/2025 0721 2.7  filed at 02/28/2025 1949   Can you do moderate work around the house like vacuuming, sweeping floors or carrying groceries? 3.5 filed at 03/14/2025 0721 3.5  filed at 02/28/2025 1949   Can you do heavy work around the house like scrubbing floors or lifting and moving heavy furniture?  8 filed at 03/14/2025 0721 8  filed at 02/28/2025 1949   Can you do yard work like raking leaves, weeding or pushing a mower? 4.5 filed at 03/14/2025 0721 4.5  filed at 02/28/2025 1949   Can you have sexual relations? 5.25 filed at 03/14/2025 0721 5.25  filed at 02/28/2025 1949   Can you participate in moderate recreational activities like golf, bowling, dancing, doubles tennis or throwing a baseball or football? 0 filed at 03/14/2025 0721 6  filed at 02/28/2025 1949   Can you participate in strenous sports like swimming, singles tennis, football, basketball, or skiing? 0 filed at 03/14/2025 0721 7.5  filed at 02/28/2025 1949   DASI SCORE 44.7 filed at 03/14/2025 0721 58.2  filed at 02/28/2025 1949   METS Score (Will be calculated only when all the questions are answered) 8.2 filed at 03/14/2025 0721 9.9  filed at 02/28/2025 1949          Caprini DVT Assessment    No data to display       Modified Frailty Index    No data to display       [id number] Stroke Risk Points  Current as of just now        N/A 0 to 9 Points:      Last Change: N/A          The [id number] risk score (Lip PASTORA, et al. 2009. © 2010 American College [employer]) quantifies the risk of stroke for a patient with atrial fibrillation.  For patients without atrial fibrillation or under the age of 18 this score appears as N/A. Higher score values generally indicate higher risk of stroke.        This score is not applicable to this patient. Components are not calculated.          Revised Cardiac Risk Index    No data to display       Apfel Simplified Score    No data to display       Risk Analysis Index Results This Encounter    No data found in the last 10 encounters.       Stop Bang Score      Flowsheet Row Pre-Admission Testing from 3/14/2025 in [facility]   Do you snore loudly? 1 filed at 03/14/2025 0721   Do you often feel tired or fatigued after your sleep? 0 filed at 03/14/2025 0721   Has anyone ever observed you stop breathing in your sleep? 1 filed at 03/14/2025 0721   Do you have or are you being treated for high blood pressure? 1 filed at 03/14/2025 0721   Recent BMI (Calculated) 26.3 filed at 03/14/2025 0721   Is BMI greater than 35 kg/m2? 0=No filed at 03/14/2025 0721   Age older than 50 years old? 1=Yes filed at 03/14/2025 0721   Is your neck circumference greater than 17 inches (Male) or 16 inches (Female)? 0 filed at 03/14/2025 0721   Gender - Male 1=Yes filed at 03/14/2025 0721   STOP-BANG Total Score 5 filed at 03/14/2025 0721          Prodigy: High Risk  Total Score: 16              Prodigy Age Score      Prodigy Gender Score          ARISCAT Score for Postoperative Pulmonary Complications    No data to display       [place] Perioperative Risk for Myocardial Infarction or Cardiac Arrest (DEANNA)    No data to display         ASSESSMENT      HTN  Takes Amlodipine, Atorvastatin, Lisinopril  ECG 3/14/2025- Sinus hayley, septal infarc,t age undetermined, 59 bpm    VARSHA-severe  UPPP in past  Not treated at this time  Plan for septoplasty/ DISE as scheduled       ANESTHESIA FINDINGS:  Intubation History: No history of difficult intubation  Significant Anesthesia Considerations:      Airway History: No abnormal airway  history  Predictors of Difficult Airway Management  ? VARSHA severe, STOP BANG -5      CONSULTS:    Patient does not require consults for optimization at this time.     The Following Tests/Procedures Have Been Initiated and Reviewed/ interpreted by me:   CBC, BMP,  ECG     Planned Anesthetic: general     Instructions Given to Patient:  *Reviewed Medications to be taken in AM of surgery or held  Patient given verbal and written preop instructions and voices comprehension and compliance.     No further testing required.      PLAN  This patient is optimally prepared for surgery.

## 2025-03-14 NOTE — PREPROCEDURE INSTRUCTIONS
Medication List            Accurate as of March 14, 2025  8:02 AM. Always use your most recent med list.                amLODIPine 10 mg tablet  Commonly known as: Norvasc  Medication Adjustments for Surgery: Take on the morning of surgery     atorvastatin 40 mg tablet  Commonly known as: Lipitor  Medication Adjustments for Surgery: Take on the morning of surgery     Clomid 50 mg tablet  Generic drug: clomiPHENE  Medication Adjustments for Surgery: Do Not take on the morning of surgery     famotidine 20 mg tablet  Commonly known as: Pepcid  Take 1 tablet (20 mg) by mouth once daily at bedtime.  Medication Adjustments for Surgery: Take on the morning of surgery     lisinopril 20 mg tablet  Medication Adjustments for Surgery: Take last dose 1 day (24 hours) before surgery     omeprazole 20 mg DR capsule  Commonly known as: PriLOSEC  TAKE 1 CAPSULE (20 MG) BY MOUTH ONCE DAILY IN THE MORNING. TAKE BEFORE MEALS. DO NOT CRUSH OR CHEW.  Medication Adjustments for Surgery: Take/Use as prescribed     tadalafil 10 mg tablet  Commonly known as: Cialis  Medication Adjustments for Surgery: Take last dose 3 days before surgery            PRE-OPERATIVE INSTRUCTIONS FOR SURGERY    *Do not eat anything after midnight the night of surgery.  This includes food of any kind (including hard candy, cough drops, mints).   You may have up to 13 ounces of clear liquid  until TWO hours prior to your scheduled surgery time,  Clear liquids include water, black tea/coffee, (no milk or cream) apple juice and electrolyte drinks (GATORADE).  You may chew gum until TWO hours prior you your surgery/procedure.           *One of our staff members will call you ONE business day before your surgery, between 11am-2 pm to let you know the time to arrive.    If you have not received a call by 2 pm, call [phone]    *When you arrive at the hospital-->GO TO Registration on the ground floor    *Stop smoking 24 hours prior to surgery.      No Marijuana,  CBD Oil or Vaping for 48 hours    *No alcohol 24 hours prior to surgery    *You will need a responsible adult to drive you home    Please shower the morning of your surgery so as to remove any body residue from applied hygiene products.  DO NOT REAPPLY any lotions, creams, powders, cologne or deodorant to your body after washing.    -No acrylic nails or nail polish on at least one fingernail, NO polish on toes for foot surgery    -You may be asked to remove your dentures, partial plate, eyeglasses or contact lenses before going to surgery.  Please bring a case for these items.    -Body piercings need to be removed.  Jewelry and valuables should be left at home.    -Put on loose,  comfortable, clean clothing, that will accommodate bandages        What you may be asked to bring to surgery:    ___PHOTO ID and insurance information        NPO (nothing by mouth) instructions:    Do not eat any food after midnight the night before your surgery/procedure.  You may have clear liquids until TWO hours before surgery/procedure. This includes water, black tea/coffee, (no milk or cream) apple juice and electrolyte drinks (Gatorade).  You may chew gum up to TWO hours before your surgery/procedure.      Additional Instructions:       Day of Surgery:  Review your medication instructions, take indicated medications  You may have clear liquids until TWO hours before surgery/procedure.  This includes water, black tea/coffee, (no milk or cream) apple juice and electrolyte drinks (Gatorade)  You may chew gum up to TWO hours before your surgery/procedure  Wear  comfortable loose fitting clothing  Do not use moisturizers, creams, lotions or perfume  All jewelry and valuables should be left at home

## 2025-03-19 LAB
ATRIAL RATE: 59 BPM
P AXIS: 41 DEGREES
P OFFSET: 195 MS
P ONSET: 154 MS
PR INTERVAL: 138 MS
Q ONSET: 223 MS
QRS COUNT: 10 BEATS
QRS DURATION: 94 MS
QT INTERVAL: 410 MS
QTC CALCULATION(BAZETT): 405 MS
QTC FREDERICIA: 407 MS
R AXIS: 36 DEGREES
T AXIS: 46 DEGREES
T OFFSET: 428 MS
VENTRICULAR RATE: 59 BPM

## 2025-03-24 ENCOUNTER — ANESTHESIA (OUTPATIENT)
Dept: OPERATING ROOM | Facility: HOSPITAL | Age: 65
End: 2025-03-24
Payer: COMMERCIAL

## 2025-03-24 ENCOUNTER — HOSPITAL ENCOUNTER (OUTPATIENT)
Facility: HOSPITAL | Age: 65
Setting detail: OUTPATIENT SURGERY
Discharge: HOME | End: 2025-03-24
Payer: COMMERCIAL

## 2025-03-24 ENCOUNTER — ANESTHESIA EVENT (OUTPATIENT)
Dept: OPERATING ROOM | Facility: HOSPITAL | Age: 65
End: 2025-03-24
Payer: COMMERCIAL

## 2025-03-24 VITALS
HEIGHT: 69 IN | BODY MASS INDEX: 25.99 KG/M2 | SYSTOLIC BLOOD PRESSURE: 152 MMHG | OXYGEN SATURATION: 99 % | RESPIRATION RATE: 16 BRPM | DIASTOLIC BLOOD PRESSURE: 80 MMHG | WEIGHT: 175.49 LBS | TEMPERATURE: 98.4 F | HEART RATE: 61 BPM

## 2025-03-24 DIAGNOSIS — J34.2 DEVIATED NASAL SEPTUM: ICD-10-CM

## 2025-03-24 DIAGNOSIS — J34.3 HYPERTROPHY OF NASAL TURBINATES: Primary | ICD-10-CM

## 2025-03-24 DIAGNOSIS — G47.33 OSA (OBSTRUCTIVE SLEEP APNEA): ICD-10-CM

## 2025-03-24 PROCEDURE — 3600000008 HC OR TIME - EACH INCREMENTAL 1 MINUTE - PROCEDURE LEVEL THREE

## 2025-03-24 PROCEDURE — 2500000005 HC RX 250 GENERAL PHARMACY W/O HCPCS

## 2025-03-24 PROCEDURE — A30520 PR REPAIR OF NASAL SEPTUM

## 2025-03-24 PROCEDURE — 30520 REPAIR OF NASAL SEPTUM: CPT

## 2025-03-24 PROCEDURE — 3600000003 HC OR TIME - INITIAL BASE CHARGE - PROCEDURE LEVEL THREE

## 2025-03-24 PROCEDURE — 2720000007 HC OR 272 NO HCPCS

## 2025-03-24 PROCEDURE — 7100000010 HC PHASE TWO TIME - EACH INCREMENTAL 1 MINUTE

## 2025-03-24 PROCEDURE — 2500000004 HC RX 250 GENERAL PHARMACY W/ HCPCS (ALT 636 FOR OP/ED)

## 2025-03-24 PROCEDURE — 7100000002 HC RECOVERY ROOM TIME - EACH INCREMENTAL 1 MINUTE

## 2025-03-24 PROCEDURE — 7100000009 HC PHASE TWO TIME - INITIAL BASE CHARGE

## 2025-03-24 PROCEDURE — A30520 PR REPAIR OF NASAL SEPTUM: Performed by: ANESTHESIOLOGY

## 2025-03-24 PROCEDURE — 3700000002 HC GENERAL ANESTHESIA TIME - EACH INCREMENTAL 1 MINUTE

## 2025-03-24 PROCEDURE — 30930 THER FX NASAL INF TURBINATE: CPT

## 2025-03-24 PROCEDURE — 2500000001 HC RX 250 WO HCPCS SELF ADMINISTERED DRUGS (ALT 637 FOR MEDICARE OP)

## 2025-03-24 PROCEDURE — 42975 DISE EVAL SLP DO BRTH FLX DX: CPT

## 2025-03-24 PROCEDURE — 30140 RESECT INFERIOR TURBINATE: CPT

## 2025-03-24 PROCEDURE — 7100000001 HC RECOVERY ROOM TIME - INITIAL BASE CHARGE

## 2025-03-24 PROCEDURE — 3700000001 HC GENERAL ANESTHESIA TIME - INITIAL BASE CHARGE

## 2025-03-24 RX ORDER — SODIUM CHLORIDE, SODIUM LACTATE, POTASSIUM CHLORIDE, CALCIUM CHLORIDE 600; 310; 30; 20 MG/100ML; MG/100ML; MG/100ML; MG/100ML
100 INJECTION, SOLUTION INTRAVENOUS CONTINUOUS
Status: ACTIVE | OUTPATIENT
Start: 2025-03-24 | End: 2025-03-24

## 2025-03-24 RX ORDER — LIDOCAINE HYDROCHLORIDE AND EPINEPHRINE 10; 10 UG/ML; MG/ML
INJECTION, SOLUTION INFILTRATION; PERINEURAL AS NEEDED
Status: DISCONTINUED | OUTPATIENT
Start: 2025-03-24 | End: 2025-03-24 | Stop reason: HOSPADM

## 2025-03-24 RX ORDER — LIDOCAINE HYDROCHLORIDE 10 MG/ML
0.1 INJECTION, SOLUTION INFILTRATION; PERINEURAL ONCE
Status: DISCONTINUED | OUTPATIENT
Start: 2025-03-24 | End: 2025-03-24 | Stop reason: HOSPADM

## 2025-03-24 RX ORDER — SODIUM CHLORIDE, SODIUM LACTATE, POTASSIUM CHLORIDE, CALCIUM CHLORIDE 600; 310; 30; 20 MG/100ML; MG/100ML; MG/100ML; MG/100ML
INJECTION, SOLUTION INTRAVENOUS CONTINUOUS PRN
Status: DISCONTINUED | OUTPATIENT
Start: 2025-03-24 | End: 2025-03-24

## 2025-03-24 RX ORDER — ONDANSETRON HYDROCHLORIDE 2 MG/ML
4 INJECTION, SOLUTION INTRAVENOUS ONCE AS NEEDED
Status: DISCONTINUED | OUTPATIENT
Start: 2025-03-24 | End: 2025-03-24 | Stop reason: HOSPADM

## 2025-03-24 RX ORDER — CEFAZOLIN 1 G/1
INJECTION, POWDER, FOR SOLUTION INTRAVENOUS AS NEEDED
Status: DISCONTINUED | OUTPATIENT
Start: 2025-03-24 | End: 2025-03-24

## 2025-03-24 RX ORDER — OXYCODONE HYDROCHLORIDE 5 MG/1
5 TABLET ORAL EVERY 6 HOURS PRN
Qty: 12 TABLET | Refills: 0 | Status: SHIPPED | OUTPATIENT
Start: 2025-03-24 | End: 2025-03-27

## 2025-03-24 RX ORDER — SODIUM CHLORIDE 0.9 G/100ML
INJECTION, SOLUTION IRRIGATION AS NEEDED
Status: DISCONTINUED | OUTPATIENT
Start: 2025-03-24 | End: 2025-03-24 | Stop reason: HOSPADM

## 2025-03-24 RX ORDER — ROCURONIUM BROMIDE 10 MG/ML
INJECTION, SOLUTION INTRAVENOUS AS NEEDED
Status: DISCONTINUED | OUTPATIENT
Start: 2025-03-24 | End: 2025-03-24

## 2025-03-24 RX ORDER — ALBUTEROL SULFATE 0.83 MG/ML
2.5 SOLUTION RESPIRATORY (INHALATION) ONCE AS NEEDED
Status: DISCONTINUED | OUTPATIENT
Start: 2025-03-24 | End: 2025-03-24 | Stop reason: HOSPADM

## 2025-03-24 RX ORDER — OXYMETAZOLINE HCL 0.05 %
SPRAY, NON-AEROSOL (ML) NASAL AS NEEDED
Status: DISCONTINUED | OUTPATIENT
Start: 2025-03-24 | End: 2025-03-24 | Stop reason: HOSPADM

## 2025-03-24 RX ORDER — MIDAZOLAM HYDROCHLORIDE 1 MG/ML
1 INJECTION, SOLUTION INTRAMUSCULAR; INTRAVENOUS ONCE AS NEEDED
Status: DISCONTINUED | OUTPATIENT
Start: 2025-03-24 | End: 2025-03-24 | Stop reason: HOSPADM

## 2025-03-24 RX ORDER — ACETAMINOPHEN 325 MG/1
650 TABLET ORAL EVERY 4 HOURS PRN
Status: DISCONTINUED | OUTPATIENT
Start: 2025-03-24 | End: 2025-03-24 | Stop reason: HOSPADM

## 2025-03-24 RX ORDER — PROPOFOL 10 MG/ML
INJECTION, EMULSION INTRAVENOUS AS NEEDED
Status: DISCONTINUED | OUTPATIENT
Start: 2025-03-24 | End: 2025-03-24

## 2025-03-24 RX ORDER — ACETAMINOPHEN 500 MG
1000 TABLET ORAL EVERY 8 HOURS PRN
Qty: 30 TABLET | Refills: 0 | Status: SHIPPED | OUTPATIENT
Start: 2025-03-24 | End: 2025-03-29

## 2025-03-24 RX ORDER — FENTANYL CITRATE 50 UG/ML
INJECTION, SOLUTION INTRAMUSCULAR; INTRAVENOUS AS NEEDED
Status: DISCONTINUED | OUTPATIENT
Start: 2025-03-24 | End: 2025-03-24

## 2025-03-24 RX ORDER — LABETALOL HYDROCHLORIDE 5 MG/ML
5 INJECTION, SOLUTION INTRAVENOUS ONCE AS NEEDED
Status: DISCONTINUED | OUTPATIENT
Start: 2025-03-24 | End: 2025-03-24 | Stop reason: HOSPADM

## 2025-03-24 RX ORDER — LIDOCAINE HCL/PF 100 MG/5ML
SYRINGE (ML) INTRAVENOUS AS NEEDED
Status: DISCONTINUED | OUTPATIENT
Start: 2025-03-24 | End: 2025-03-24

## 2025-03-24 RX ORDER — OXYMETAZOLINE HCL 0.05 %
2 SPRAY, NON-AEROSOL (ML) NASAL EVERY 12 HOURS PRN
Qty: 30 ML | Refills: 0 | Status: SHIPPED | OUTPATIENT
Start: 2025-03-24

## 2025-03-24 RX ORDER — ONDANSETRON HYDROCHLORIDE 2 MG/ML
INJECTION, SOLUTION INTRAVENOUS AS NEEDED
Status: DISCONTINUED | OUTPATIENT
Start: 2025-03-24 | End: 2025-03-24

## 2025-03-24 RX ORDER — MIDAZOLAM HYDROCHLORIDE 1 MG/ML
INJECTION, SOLUTION INTRAMUSCULAR; INTRAVENOUS AS NEEDED
Status: DISCONTINUED | OUTPATIENT
Start: 2025-03-24 | End: 2025-03-24

## 2025-03-24 RX ORDER — MEPERIDINE HYDROCHLORIDE 50 MG/ML
12.5 INJECTION INTRAMUSCULAR; INTRAVENOUS; SUBCUTANEOUS EVERY 10 MIN PRN
Status: DISCONTINUED | OUTPATIENT
Start: 2025-03-24 | End: 2025-03-24 | Stop reason: HOSPADM

## 2025-03-24 RX ORDER — HYDRALAZINE HYDROCHLORIDE 20 MG/ML
5 INJECTION INTRAMUSCULAR; INTRAVENOUS EVERY 30 MIN PRN
Status: DISCONTINUED | OUTPATIENT
Start: 2025-03-24 | End: 2025-03-24 | Stop reason: HOSPADM

## 2025-03-24 RX ADMIN — DEXAMETHASONE SODIUM PHOSPHATE 8 MG: 4 INJECTION, SOLUTION INTRAMUSCULAR; INTRAVENOUS at 11:09

## 2025-03-24 RX ADMIN — ONDANSETRON 4 MG: 2 INJECTION INTRAMUSCULAR; INTRAVENOUS at 11:37

## 2025-03-24 RX ADMIN — FENTANYL CITRATE 50 MCG: 50 INJECTION, SOLUTION INTRAMUSCULAR; INTRAVENOUS at 12:01

## 2025-03-24 RX ADMIN — FENTANYL CITRATE 50 MCG: 50 INJECTION, SOLUTION INTRAMUSCULAR; INTRAVENOUS at 11:03

## 2025-03-24 RX ADMIN — ROCURONIUM BROMIDE 50 MG: 10 INJECTION, SOLUTION INTRAVENOUS at 11:04

## 2025-03-24 RX ADMIN — SODIUM CHLORIDE, POTASSIUM CHLORIDE, SODIUM LACTATE AND CALCIUM CHLORIDE: 600; 310; 30; 20 INJECTION, SOLUTION INTRAVENOUS at 10:49

## 2025-03-24 RX ADMIN — PROPOFOL 100 MG: 10 INJECTION, EMULSION INTRAVENOUS at 11:03

## 2025-03-24 RX ADMIN — MIDAZOLAM 2 MG: 1 INJECTION INTRAMUSCULAR; INTRAVENOUS at 10:53

## 2025-03-24 RX ADMIN — PROPOFOL 20 MG: 10 INJECTION, EMULSION INTRAVENOUS at 10:58

## 2025-03-24 RX ADMIN — PROPOFOL 60 MG: 10 INJECTION, EMULSION INTRAVENOUS at 10:56

## 2025-03-24 RX ADMIN — CEFAZOLIN 2 G: 1 INJECTION, POWDER, FOR SOLUTION INTRAMUSCULAR; INTRAVENOUS at 11:07

## 2025-03-24 RX ADMIN — REMIFENTANIL HYDROCHLORIDE 0.05 MCG/KG/MIN: 1 INJECTION, POWDER, LYOPHILIZED, FOR SOLUTION INTRAVENOUS at 11:10

## 2025-03-24 RX ADMIN — PROPOFOL 20 MG: 10 INJECTION, EMULSION INTRAVENOUS at 10:59

## 2025-03-24 RX ADMIN — LIDOCAINE HYDROCHLORIDE 60 MG: 20 INJECTION INTRAVENOUS at 10:56

## 2025-03-24 RX ADMIN — SUGAMMADEX 200 MG: 100 INJECTION, SOLUTION INTRAVENOUS at 11:45

## 2025-03-24 SDOH — HEALTH STABILITY: MENTAL HEALTH: CURRENT SMOKER: 0

## 2025-03-24 ASSESSMENT — PAIN - FUNCTIONAL ASSESSMENT
PAIN_FUNCTIONAL_ASSESSMENT: VAS (VISUAL ANALOG SCALE)
PAIN_FUNCTIONAL_ASSESSMENT: 0-10

## 2025-03-24 ASSESSMENT — PAIN SCALES - GENERAL
PAIN_LEVEL: 0
PAINLEVEL_OUTOF10: 0 - NO PAIN

## 2025-03-24 ASSESSMENT — ENCOUNTER SYMPTOMS
CARDIOVASCULAR NEGATIVE: 1
EYES NEGATIVE: 1
RESPIRATORY NEGATIVE: 1
CONSTITUTIONAL NEGATIVE: 1
GASTROINTESTINAL NEGATIVE: 1

## 2025-03-24 NOTE — H&P
"History Of Present Illness  Angel eMlendez is a 64 y.o. male presenting with VARSHA.     Past Medical History  He has a past medical history of GERD (gastroesophageal reflux disease), Hearing aid worn, HL (hearing loss), Hyperlipidemia, Hypertension, Inguinal hernia, Nephrolithiasis, Sinusitis, Sleep apnea, and Vision loss.    Surgical History  He has a past surgical history that includes Uvulopalatopharyngoplasty; Hernia repair (Left); and Foot fracture surgery (Right).     Social History  He reports that he has never smoked. He has never used smokeless tobacco. He reports current alcohol use. He reports that he does not use drugs.    Family History  Family History   Problem Relation Name Age of Onset    Breast cancer Mother      Valvular heart disease Father      Colon cancer Father      COPD Sister      Colon cancer Sister          Allergies  Patient has no known allergies.    Review of Systems   Constitutional: Negative.    HENT: Negative.     Eyes: Negative.    Respiratory: Negative.     Cardiovascular: Negative.    Gastrointestinal: Negative.         Physical Exam  HENT:      Head: Normocephalic.      Nose: Nose normal.   Musculoskeletal:      Cervical back: Normal range of motion.   Neurological:      Mental Status: He is alert.          Last Recorded Vitals  Blood pressure 169/89, pulse 72, temperature 36.1 °C (97 °F), temperature source Temporal, resp. rate 18, height 1.753 m (5' 9\"), weight 79.6 kg (175 lb 7.8 oz), SpO2 95%.    Relevant Results        Scheduled medications    Continuous medications    PRN medications    \     Assessment/Plan   Assessment & Plan  Hypertrophy of nasal turbinates    Deviated nasal septum    VARSHA (obstructive sleep apnea)      Proceed with DISE, septoplasty and turbinate reduction as discussed.        I spent 15 minutes in the professional and overall care of this patient.      Rozina Humphrey MD    "

## 2025-03-24 NOTE — ANESTHESIA PROCEDURE NOTES
Airway  Date/Time: 3/24/2025 11:05 AM  Urgency: elective    Airway not difficult    Staffing  Performed: SRNA   Authorized by: Jose Hyatt MD    Performed by: Rigoberto Yarbrough  Patient location during procedure: OR    Indications and Patient Condition  Indications for airway management: anesthesia  Spontaneous Ventilation: absent  Sedation level: deep  Preoxygenated: yes  Patient position: sniffing  MILS maintained throughout  Mask difficulty assessment: 1 - vent by mask  Planned trial extubation    Final Airway Details  Final airway type: endotracheal airway      Successful airway: ETT     Successful intubation technique: direct laryngoscopy  Facilitating devices/methods: intubating stylet  Endotracheal tube insertion site: oral  Blade: Heidy  Blade size: #4  ETT size (mm): 7.5  Cormack-Lehane Classification: grade I - full view of glottis  Placement verified by: capnometry   Measured from: teeth  ETT to teeth (cm): 23  Number of attempts at approach: 1

## 2025-03-24 NOTE — ANESTHESIA POSTPROCEDURE EVALUATION
Patient: Angel Melendez    Procedure Summary       Date: 03/24/25 Room / Location: [facility] 04 / [facility]    Anesthesia Start: 1049 Anesthesia Stop: 1201    Procedures:       SEPTOPLASTY (Nose)      BILATERAL TURBINATE OUTFRACTURE (Bilateral)      BILATERAL SUBMUCOSAL REDUCTION (Bilateral)      DRUG INDUCED SLEEP ENDOSCOPY Diagnosis:       Hypertrophy of nasal turbinates      Deviated nasal septum      VARSHA (obstructive sleep apnea)      (Hypertrophy of nasal turbinates [J34.3])      (Deviated nasal septum [J34.2])      (VARSHA (obstructive sleep apnea) [G47.33])    Surgeons: Rozina Humphrey MD Responsible Provider: Jose Hyatt MD    Anesthesia Type: general ASA Status: 3            Anesthesia Type: general    Vitals Value Taken Time   /96 03/24/25 1200   Temp 36.9 03/24/25 1202   Pulse 62 03/24/25 1200   Resp 16 03/24/25 1202   SpO2 99 % 03/24/25 1200   Vitals shown include unfiled device data.    Anesthesia Post Evaluation    Patient location during evaluation: PACU  Patient participation: complete - patient participated  Level of consciousness: awake and alert  Pain score: 0  Pain management: satisfactory to patient  Airway patency: patent  Cardiovascular status: acceptable and hemodynamically stable  Respiratory status: acceptable, face mask, spontaneous ventilation and nonlabored ventilation  Hydration status: acceptable  Postoperative Nausea and Vomiting: none        There were no known notable events for this encounter.

## 2025-03-24 NOTE — DISCHARGE INSTRUCTIONS
[facility]        Home Going Instructions after Septoplasty/ turbinate reduction    Activity:   We do not recommend any exercise on the next 14 days of your septoplasty.  Return to work will be discussed after your post-op appointment.  You may have light bleeding or spotting for several days after septoplasty .      Eating/drinking:  Drink small amounts of liquids initially and then slowly increase your intake of food. Drinking fluids will keep your bowels regular.   Avoid foods that are spicy or hard to digest today.  You may take a stool softener or miralax/milk of magnesia to help with constipation that may occur after anesthesia.  Please make sure a responsible adult is with you for at least 24 hours after surgery and do not drive or make important decisions during this time. Anesthesia may affect your judgment, coordination, and reaction time.      Bleeding:  It is normal to experience some bleeding from the mouth and nose for the first 7 to 10 days after your jaw surgery. This should not however, be profuse, excessive.  Applying ice over the nose and face will usually help stop the bleeding. You may also squirt afrin to both nose to help stop bleeding.   Pain:  As discussed this is a painful post-op. Please stay on top of pain medication for the initial 5-7 days. As you feel pain is not that significant try to tamper of stronger pain medication (oxycodone) and relay more on tylenol.     Trouble Breathing  If you have trouble breathing through your nose, spray Afrin once in each nostril for decongestion. Make sure to use nasal saline (2 sprays in each nostril every 3 hours) to remove blood debris.     Follow up:  Follow up with Dr Kunz a few days after your procedure as scheduled to check in and make sure you know what your next steps are.     When to call your provider: (Provider: [phone]. After hours call  and ask for the ENT resident  on-call)  Profuse bleeding that does not taper down.  Fever of 100.4 or higher with chills.    Rozina Humphrey MD

## 2025-03-24 NOTE — ANESTHESIA PREPROCEDURE EVALUATION
Patient: Angel Melendez    Procedure Information       Date/Time: 03/24/25 1000    Procedures:       SEPTOPLASTY - Procedure: drug induced sleep endoscopy under MAC, Septoplasty, bilateral submucosal resection and bilateral turbinate outfracture    Procedure time: 1 hr    Precert department, send Dr Kunz's note from 2/28/2025 and sleep study from 11/15/2024      BILATERAL TURBINATE OUTFRACTURE (Bilateral)      BILATERAL SUBMUCOSAL REDUCTION (Bilateral)      DRUG INDUCED SLEEP ENDOSCOPY    Location: [facility] / [facility]    Surgeons: Rozina Humphrey MD            Relevant Problems   Anesthesia (within normal limits)      Pulmonary   (+) VARSHA (obstructive sleep apnea)       Clinical information reviewed:    Allergies  Meds               NPO Detail:  NPO/Void Status  Date of Last Liquid: 03/24/25  Time of Last Liquid: 0730  Date of Last Solid: 03/23/25  Time of Last Solid: 2130         Physical Exam    Airway  Mallampati: II  TM distance: >3 FB  Neck ROM: full     Cardiovascular   Rhythm: regular  Rate: normal     Dental    Pulmonary    Abdominal        Anesthesia Plan    History of general anesthesia?: yes  History of complications of general anesthesia?: no    ASA 3     general     The patient is not a current smoker.  Patient was not previously instructed to abstain from smoking on day of procedure.  Patient did not smoke on day of procedure.    intravenous induction   Anesthetic plan and risks discussed with patient.  Use of blood products discussed with patient who.    Plan discussed with CRNA.

## 2025-03-24 NOTE — OP NOTE
SEPTOPLASTY, BILATERAL TURBINATE OUTFRACTURE (B), BILATERAL SUBMUCOSAL REDUCTION (B), DRUG INDUCED SLEEP ENDOSCOPY Operative Note     Date: 3/24/2025  OR Location: [facility]    Name: Angel Melendez, : 1960, Age: 64 y.o., MRN: [id number], Sex: male    Diagnosis  Pre-op Diagnosis      * Hypertrophy of nasal turbinates [J34.3]     * Deviated nasal septum [J34.2]     * VARSHA (obstructive sleep apnea) [G47.33] Post-op Diagnosis     * Hypertrophy of nasal turbinates [J34.3]     * Deviated nasal septum [J34.2]     * VARSHA (obstructive sleep apnea) [G47.33]     Procedures  SEPTOPLASTY  41917 - CA SEPTOPLASTY/SUBMUCOUS RESECJ W/WO CARTILAGE GRF    BILATERAL TURBINATE OUTFRACTURE  28970 - CA SUBMUCOUS RESCJ INFERIOR TURBINATE PRTL/COMPL    BILATERAL SUBMUCOSAL REDUCTION  56605 - CA FRACTURE NASAL INFERIOR TURBINATE THERAPEUTIC    DRUG INDUCED SLEEP ENDOSCOPY  05815 - CA DISE DYN EVAL SLEEP DISORDERED BREATHING FLX DX      Surgeons      * Rozina Humphrey - Primary    Resident/Fellow/Other Assistant:  Surgeons and Role:  * No surgeons found with a matching role *    Staff:   Circulator: Kell  Scrub Person: Cody Pandey Scrub: Norma Pandey Circulator: Ester    Anesthesia Staff: Anesthesiologist: Jose Hyatt MD  CRNA: JASON Mccracken-THERESE  SRNA: Rigoberto Yarbrough    Procedure Summary  Anesthesia: General  ASA: III  Estimated Blood Loss: 10 mL  Intra-op Medications: * Intraprocedure medication information is unavailable because the case start and end events have not been set *           Anesthesia Record               Intraprocedure I/O Totals          Intake    Remifentanil Drip 0.00 mL    The total shown is the total volume documented since Anesthesia Start was filed.    Total Intake 0 mL          Specimen: No specimens collected              Drains and/or Catheters: * None in log *    Tourniquet Times:         Implants:     Findings: Collapsible airway. Deviated septum.     Indications: Angel WASSERMAN  Al is an 64 y.o. male who is having surgery for Hypertrophy of nasal turbinates [J34.3]  Deviated nasal septum [J34.2]  VARSHA (obstructive sleep apnea) [G47.33].     The patient was seen in the preoperative area. The risks, benefits, complications, treatment options, non-operative alternatives, expected recovery and outcomes were discussed with the patient. The possibilities of reaction to medication, pulmonary aspiration, injury to surrounding structures, bleeding, recurrent infection, the need for additional procedures, failure to diagnose a condition, and creating a complication requiring transfusion or operation were discussed with the patient. The patient concurred with the proposed plan, giving informed consent.  The site of surgery was properly noted/marked if necessary per policy. The patient has been actively warmed in preoperative area. Preoperative antibiotics have been ordered and given within 1 hours of incision. Venous thrombosis prophylaxis have been ordered including bilateral sequential compression devices    Procedure Details:   Description of Procedure:      Patient was taken to the operating room 04 by the anesthesia service, and remained in supine position. Surgical time out was performed. Oxymetazoline was used to decongest nasal mucosa. Patient was sedated with Propofol for the sleep endoscopy.      Once effective Propofol sedation was achieved, patient slept comfortably.    A flexible endoscope was passed via the nares to evaluate sites of dynamic airway collapse.      STRUCTURE  OBSTRXN A-P  LATERAL  CONCENTRIC  COMMENTS    VELUM  2 2         OROPHARYNX  0   0       TONGUE BASE  2 2          EPIGLOTTIS  1 1             There is significant nasal obstruction and congestion.     The most significant finding was AP collapse of tongue and palate.      Jaw thrust significantly improved airway collapsibility.        Patient's lowest oxygen desaturation was 90%.     The patient was then  awakened in the operating room with discontinuation of Propofol infusion.     With these findings patient would be a good surgical candidate for HNS.       Procedure Details:   PROCEDURES PERFORMED:  1. Septoplasty   2. Inferior turbinate outfracture and reduction     ANESTHESIA: General anesthesia via oral endotracheal tube            DESCRIPTION OF PROCEDURE:        The patient was taken from the preoperative area to the [place] [address] by the anesthesia service. A surgery huddle was performed. The patient was then intubated via oral endotracheal tube without complications. The patient was then turned back to the otolaryngology sleep surgery service.        The nasal septum was injected with a mixture of 1% lidocaine with 1:100,000 of epinephrine bilaterally. An afrin-soaked pledget was placed in each nostril. The inferior turbinates were injected with 1% lidocaine with 1:100,000 epinephrine, and sprayed with oxymetazoline nasal spray. The face was prepped and draped in the usual sterile fashion. The eyelids were gently taped sterilely after removing any prep from the lid skin. Surgical time out was performed.        Attention was then turned to the nasal procedures.  A left-sided hemitransfixion incision was made with a #15 blade. A submucoperichondrial plane was elevated posteriorly to the bony septum, inferiorly to the floor of the nose, and superiorly to the dorsum. Preserving greater than one cm L-strut of dorsal and caudal septal cartilage, 15 blade was used to remove the quadrangular cartilage inferoposteriorly. This was saved in saline. Bony irregularities of the septum were then addressed. Care was taken to avoid torque on the skull base. The keystone area was preserved.    A 5-0 fast was used to suture the marbella transfixion incision.       Submucosal reduction of the inferior turbinates was performed bilaterally with the microdebrider. Mucosa was preserved. Meagher elevator was used to infracture the  turbinates, followed by outfracturing by a [place] elevator.      Bilateral jimenez splints coated in bacitracin were placed. A 2-0 nylon was used to secure splints through the septum anteriorly. Thorough wound check was performed.       The pharynx was then reinspected to ensure hemostasis.         Patient was then turned back to anesthesia and extubated without complications.       Condition: Stable to PACU.       Fluids: see anesthesia      Complication: None       Attending surgeon was scrubbed in and actively performed the operation       Complications:  None; patient tolerated the procedure well.    Disposition: PACU - hemodynamically stable.  Condition: stable       Additional Details: None    Attending Attestation: I was present and scrubbed for the entire procedure.    Rozina Humphrey  Phone Number: [phone]

## 2025-03-25 NOTE — PROGRESS NOTES
"3/26/2025 Post op visit sp DISE septoplasty and turbinates    Patient returns today on day 2 of septoplasty, turbinate reduction.  Patient reports no significant pain.  Patient has taken postop medication correctly. Today exam shows septal in midline, inferior turbinates in adequate position/size.  Patient is going to continue nasal saline rinses and return for remaining follow-up        Drug induces sleep endoscopy completed on 3/24/2025  STRUCTURE  OBSTRXN A-P  LATERAL  CONCENTRIC  COMMENTS    VELUM  2 2         OROPHARYNX  0   0       TONGUE BASE  2 2          EPIGLOTTIS  1 1             There is significant nasal obstruction and congestion.      The most significant finding was AP collapse of tongue and palate.      Jaw thrust significantly improved airway collapsibility.         Patient's lowest oxygen desaturation was 90%.      The patient was then awakened in the operating room with discontinuation of Propofol infusion.      With these findings patient would be a good surgical candidate for HNS.     Patient will decide between Inspire and OAT.    Inspire qualifications for [employer]    18 years and older   1960    BMI < or = 40  -BMI 26    If AHI is abov 65 or BMI is above published policy a letter documenting benefits at the higher numbers will be needed for submission(Inspire has template)  AHI3% was 59.8    Diagnostic PSG/HST within 2 years of initial consult is required for most -HST 11/15/2024    [employer] will review outside of criteria          2025 New patient visit for severe VARSHA and surgical consult    Pt referred be ENT, [facility] Felipe. At last visit Dr Felipe writes:  \"63yoM with asymmetric SNHL without concerning findings on MRI IAC.  Medically cleared for hearing amplification PRN.    Ongoing symptoms of VARSHA.  Prior mild VARSHA on PSG >20y ago and wife reports worsening symptoms.  Prior UPPPs.  Will acquire PSG to guide management.  Discussed options of possible CPAP, septoturbinoplasty, " "referral for Inspire.  Pt will consider based on PSG results.   Good effect of PPI on relieving GERD symptoms.  Not taking H2 blocker with any consistency.  Discussed ongoing taper of medical management in favor of dietary/behavioral controls. \"    referred for an initial consultation with a long history of reported loud snoring, witnessed apneas, waking up feeling unrefreshed, excessive daytime fatigue. Barksdale Sleepiness Scale Score is 14 /24. Fatigue Severity Scale Score is 37 /63. Snoring VAS 10 /10    Sleep study histories: (personally reviewed raw data such as interpretation report, data sheet, hypnogram, and titration table)  Home Sleep Test Completed 11/15/2024  The REI3% was 59.8/hr. The REI4% was 48.6/hr. TIMMY was 0.7/hr.   The supine REI3% was 80.9; non-supine REI3% was 35.8.   The supine REI4% was 71.9; non-supine REI4% was 22.1.   During sleep, based on REI3%, the breathing pattern demonstrated severe sleep disordered breathing, characterized predominantly by obstructive events.   The mean oxygen saturation was 91% during the monitoring time.   The oxygen saturation was < = 88% for 86 minutes.   The SpO2 caleb was 66%.     Patient reports to be claustrophobic and can not tolerate PAP mask.     For nocturnal symptoms (without or prior to treatment), he endorses that there is  snoring, witnessed apneas, nocturia and restless sleep.    Patient does reports nasal obstruction.  It does fluctuate, and has been treated with nasal corticosteroids without significant improvements.  He does not have a history of nasal surgery or upper airway surgery. He does not have a history of nasal trauma. He does not describe a history of facial pressure type of headaches.  He does not report a history of nasal drainage, denies epistaxis.   NOSE-  15 / 20  Nasal Obstruction VAS- 7 /10    PAP - no history    Inspire qualifications for [employer]    18 years and older    BMI < or = 40    If AHI is abov 65 or BMI is above published " policy a letter documenting benefits at the higher numbers will be needed for submission(Inspire has template)    Diagnostic PSG/HST within 2 years of initial consult is required for most    PHYSICAL EXAMINATION:  Constitutional:  No acute distress  Voice:  No hoarseness or other abnormality   Respiration:  Breathing comfortably, no stridor  Eyes:  EOM intact, sclera normal  Neuro:  Alert and conversive  Head and Face:  Symmetric facial features, no masses or lesions. Some redness/flaky skin around forehead and cheeks.   Salivary Glands:  Parotid and submandibular glands normal bilaterally  Right Ear:  Normal external ear, external auditory canal, and TM to otoscopy  Left Ear: Normal external ear, external auditory canal, and TM to otoscopy  Nose:  External nose midline, anterior rhinoscopy is normal with limited visualization to the anterior aspect of the inferior turbinates, no bleeding or drainage, no lesions  Oral Cavity/Oropharynx/Lips:  Normal mucous membranes, normal floor of mouth/tongue/OP, no masses or lesions, tonsils 2+. Mallampati/Martins grade 3. Patient has several missing teeth and pronounced teeth decay. Teeth are crowded.   Neck/Lymph:  No LAD, no thyroid masses, trachea midline  Skin:  Neck skin is without scar or injury  Psych:  Alert with appropriate mood and affect      Procedure: Diagnostic Nasal/ Pharyngeal Endoscopy     Indication for procedure: To evaluate static and dynamic upper anatomy not visible by anterior rhinoscopy and oral cavitiy examination for anatomic risk factors of obstructive sleep apnea.      Anesthesia: 1% phenylephrine,4% lidocaine topical spray     Description:   A flexible endoscope was used to examine the left and right nasal cavities.  The nasal valve areas were examined for abnormalities or collapse.  The inferior and middle turbinates were evaluated and abnormalities noted. The scope was then passed through the nasopharygneal, oropharyngeal, and hypopharyngeal  airway. The Kern Maneuver was performed with the patient in sitting position.Collapse was assessed during a maximal inspiratory effort against a closed mouth and sealed nose. The patient tolerated the procedure without complications and was returned to ambulatory status.       Findings:   Nasopharynx: There is not adenoid hypertrophy.   Oropharynx: There is not palatine tonsillar hypertrophy.   With Mckeon maneuver, soft palatal collapse is grade 2, lateral pharyngeal wall collapse is grade 1.  Hypopharynx: There is not lingual tonsillar hypertrophy, with lingual tonsils of Grade 2. Vocal Cord position with Grade 3 view.  With Mckeon maneuver, base of tongue collapse is grade 3. This is improved with the patient doing a jaw thrust maneuver.    Diagnose:  VARSHA   Deviated septum   Chronic nasal Obstruction    Plan:  Dise + septoplasty + turbinate reduction  OAT is likely not an option due to described teeth and occlusion problems.

## 2025-03-26 ENCOUNTER — OFFICE VISIT (OUTPATIENT)
Dept: OTOLARYNGOLOGY | Facility: CLINIC | Age: 65
End: 2025-03-26
Payer: COMMERCIAL

## 2025-03-26 VITALS — BODY MASS INDEX: 26.49 KG/M2 | TEMPERATURE: 97.6 F | WEIGHT: 179.4 LBS

## 2025-03-26 DIAGNOSIS — G47.33 OSA (OBSTRUCTIVE SLEEP APNEA): Primary | ICD-10-CM

## 2025-03-26 PROCEDURE — 99024 POSTOP FOLLOW-UP VISIT: CPT

## 2025-03-26 PROCEDURE — 1036F TOBACCO NON-USER: CPT

## 2025-03-26 ASSESSMENT — ENCOUNTER SYMPTOMS
EYES NEGATIVE: 1
CONSTITUTIONAL NEGATIVE: 1
RESPIRATORY NEGATIVE: 1

## 2025-03-26 NOTE — H&P
"History Of Present Illness  Angel Melendez is a 64 y.o. male presenting with VARSHA and chronic nasal obstruction.      Past Medical History  He has a past medical history of GERD (gastroesophageal reflux disease), Hearing aid worn, HL (hearing loss), Hyperlipidemia, Hypertension, Inguinal hernia, Nephrolithiasis, Sinusitis, Sleep apnea, and Vision loss.    Surgical History  He has a past surgical history that includes Uvulopalatopharyngoplasty; Hernia repair (Left); and Foot fracture surgery (Right).     Social History  He reports that he has never smoked. He has never used smokeless tobacco. He reports current alcohol use. He reports that he does not use drugs.    Family History  Family History   Problem Relation Name Age of Onset    Breast cancer Mother      Valvular heart disease Father      Colon cancer Father      COPD Sister      Colon cancer Sister          Allergies  Patient has no known allergies.    Review of Systems   Constitutional: Negative.    HENT: Negative.     Eyes: Negative.    Respiratory: Negative.          Physical Exam  HENT:      Head: Normocephalic.      Nose: Nose normal.   Musculoskeletal:      Cervical back: Normal range of motion.   Neurological:      Mental Status: He is alert.          Last Recorded Vitals  Blood pressure 152/80, pulse 61, temperature 36.9 °C (98.4 °F), temperature source Temporal, resp. rate 16, height 1.753 m (5' 9\"), weight 79.6 kg (175 lb 7.8 oz), SpO2 99%.    Relevant Results        Scheduled medications    Continuous medications    PRN medications           Assessment/Plan   Assessment & Plan  Hypertrophy of nasal turbinates    Deviated nasal septum    VARSHA (obstructive sleep apnea)      Proceed with Drug induced sleep endoscopy (DISE) to determine airway mechanism of collapse and patient's candidacy for HNS and septoplasty, turbinate reduction as discussed.          I spent 15 minutes in the professional and overall care of this patient.      Rozina Kunz " MD [place]ag[place]

## 2025-03-28 ENCOUNTER — APPOINTMENT (OUTPATIENT)
Dept: OTOLARYNGOLOGY | Facility: CLINIC | Age: 65
End: 2025-03-28
Payer: COMMERCIAL

## 2025-04-02 ENCOUNTER — APPOINTMENT (OUTPATIENT)
Dept: OTOLARYNGOLOGY | Facility: CLINIC | Age: 65
End: 2025-04-02
Payer: COMMERCIAL

## 2025-05-09 ENCOUNTER — TELEPHONE (OUTPATIENT)
Dept: OTOLARYNGOLOGY | Facility: HOSPITAL | Age: 65
End: 2025-05-09
Payer: COMMERCIAL

## 2025-05-09 NOTE — TELEPHONE ENCOUNTER
Topic: Submit for auth on Inspire surgery     Pt called Dr Kunz's office asking if we submitted for prior auth on his Inspire surgery.    I called pt back and told him the last note from Dr Kunz was that he was going to let us know if he wanted to pursue Inspire implant or try oral appliance.    Pt states he still is trying to make that decision but he would like to know if the surgery will be approved before he give us an answer.    I sent all needed documents to [employer] requesting that they submit for auth for Inspire Implant, but the date is TBD.    We will wait to haer back from [employer] regarding insurance decision

## 2025-05-23 ENCOUNTER — TELEPHONE (OUTPATIENT)
Dept: OTOLARYNGOLOGY | Facility: CLINIC | Age: 65
End: 2025-05-23
Payer: COMMERCIAL

## 2025-05-23 NOTE — TELEPHONE ENCOUNTER
Topic: Inspire surgery approval, but null and void since new insurance plan starts 7/1/2025    I called pt to let him know that his Inspire surgery is approved through [employer].    Auth ref# [id number]  Codes: 70854, , , , ,   DX: G47.33  DOS: 5/12/25-11/08/25  Facility Name: [facility]    Pt states that he just found out last week that His insurance will change to [facility] on July 1st.    Pt aware that we do not have any surgery openings at this time in June.    Pt requesting that when he gets his new insurance info that we submit for authorization with [facility] and then when approved we will set a date for surgery    Pt aware to call Dr Kunz's office with the new insurance information

## 2025-05-28 ENCOUNTER — TELEPHONE (OUTPATIENT)
Dept: OTOLARYNGOLOGY | Facility: CLINIC | Age: 65
End: 2025-05-28
Payer: COMMERCIAL

## 2025-05-28 NOTE — TELEPHONE ENCOUNTER
Topic: offered earlier date for Inspire implant.    I called pt and offered an earlier date for surgery of 6/2 and pt declined stating [place] is going out [place] on [place].    Pt states he will call Dr Kunz's office in late June with details of new insurance and then surgery auth will be needed.

## 2025-08-12 ENCOUNTER — TELEPHONE (OUTPATIENT)
Dept: OTOLARYNGOLOGY | Facility: CLINIC | Age: 65
End: 2025-08-12
Payer: COMMERCIAL

## (undated) DEVICE — ELECTRODE, ELECTROSURGICAL, COATED NEEDLE, EDGE, STERILE

## (undated) DEVICE — TUBING, CLEAR N-COND, 5MM X 10, LF

## (undated) DEVICE — Device

## (undated) DEVICE — CORD, CAUTERY, BIOPOLAR FORCEP, 12FT

## (undated) DEVICE — SYRINGE, 10 CC, LUER LOCK

## (undated) DEVICE — COAGULATOR, SUCTION, LECTROVAC, 10 FR, 6 IN

## (undated) DEVICE — PROTECTOR, HEEL/ANKLE/ELBOW, UNIVERSAL

## (undated) DEVICE — BOWL SET, SMALL, DELUXE, STERILE

## (undated) DEVICE — SOLUTION, ANTI FOG, W/SPONGE, ENDOMATE

## (undated) DEVICE — ELECTRODE, ELECTROSURGICAL, BLADE, INSULATED, ENT/IMA, STERILE

## (undated) DEVICE — DRAPE, INSTRUMENT, W/POUCH, STERI DRAPE, 7 X 11 IN, DISPOSABLE, STERILE

## (undated) DEVICE — SLEEVE, VASO PRESS, CALF GARMENT, MEDIUM, GREEN

## (undated) DEVICE — STRIP, SKIN CLOSURE, STERI STRIP, REINFORCED, 0.5 X 4 IN

## (undated) DEVICE — SPONGE, NEURO, 1/2 X 3IN, STERILE, LF

## (undated) DEVICE — VATHIN, SINGLE USERHINOLARYNGOSCOPE DIAG

## (undated) DEVICE — STAPLER, SKIN PROXIMATE, 35 WIDE